# Patient Record
Sex: FEMALE | URBAN - METROPOLITAN AREA
[De-identification: names, ages, dates, MRNs, and addresses within clinical notes are randomized per-mention and may not be internally consistent; named-entity substitution may affect disease eponyms.]

---

## 2024-10-08 ENCOUNTER — APPOINTMENT (OUTPATIENT)
Dept: PRIMARY CARE | Facility: CLINIC | Age: 54
End: 2024-10-08
Payer: MEDICARE

## 2024-10-08 VITALS
DIASTOLIC BLOOD PRESSURE: 92 MMHG | WEIGHT: 238.2 LBS | HEIGHT: 65 IN | BODY MASS INDEX: 39.69 KG/M2 | SYSTOLIC BLOOD PRESSURE: 130 MMHG | OXYGEN SATURATION: 97 % | TEMPERATURE: 97.9 F | HEART RATE: 82 BPM

## 2024-10-08 DIAGNOSIS — M10.9 GOUT, UNSPECIFIED CAUSE, UNSPECIFIED CHRONICITY, UNSPECIFIED SITE: ICD-10-CM

## 2024-10-08 DIAGNOSIS — R53.81 DEBILITY: Primary | ICD-10-CM

## 2024-10-08 DIAGNOSIS — F41.1 GAD (GENERALIZED ANXIETY DISORDER): ICD-10-CM

## 2024-10-08 DIAGNOSIS — N39.0 FREQUENT UTI: ICD-10-CM

## 2024-10-08 DIAGNOSIS — M31.6 GCA (GIANT CELL ARTERITIS) (MULTI): ICD-10-CM

## 2024-10-08 DIAGNOSIS — F32.A DEPRESSION, UNSPECIFIED DEPRESSION TYPE: ICD-10-CM

## 2024-10-08 DIAGNOSIS — K90.89 OTHER INTESTINAL MALABSORPTION: ICD-10-CM

## 2024-10-08 DIAGNOSIS — K58.0 IRRITABLE BOWEL SYNDROME WITH DIARRHEA: ICD-10-CM

## 2024-10-08 DIAGNOSIS — E83.10 DISORDER OF IRON METABOLISM, UNSPECIFIED: ICD-10-CM

## 2024-10-08 DIAGNOSIS — Z72.0 TOBACCO ABUSE: ICD-10-CM

## 2024-10-08 DIAGNOSIS — K63.5 POLYP OF COLON, UNSPECIFIED PART OF COLON, UNSPECIFIED TYPE: ICD-10-CM

## 2024-10-08 DIAGNOSIS — M06.9 RHEUMATOID ARTHRITIS INVOLVING MULTIPLE SITES, UNSPECIFIED WHETHER RHEUMATOID FACTOR PRESENT (MULTI): ICD-10-CM

## 2024-10-08 PROBLEM — H54.7 VISUAL LOSS: Status: ACTIVE | Noted: 2024-10-08

## 2024-10-08 PROBLEM — N20.0 NEPHROLITHIASIS: Status: ACTIVE | Noted: 2024-10-08

## 2024-10-08 PROBLEM — Z98.84 H/O GASTRIC BYPASS: Status: ACTIVE | Noted: 2024-10-08

## 2024-10-08 PROBLEM — M15.0 PRIMARY OSTEOARTHRITIS INVOLVING MULTIPLE JOINTS: Status: ACTIVE | Noted: 2024-10-08

## 2024-10-08 PROBLEM — J30.2 SEASONAL ALLERGIES: Status: ACTIVE | Noted: 2024-10-08

## 2024-10-08 PROBLEM — E28.2 PCOS (POLYCYSTIC OVARIAN SYNDROME): Status: ACTIVE | Noted: 2024-10-08

## 2024-10-08 PROBLEM — Z90.49 HISTORY OF APPENDECTOMY: Status: ACTIVE | Noted: 2024-10-08

## 2024-10-08 LAB
POC APPEARANCE, URINE: CLEAR
POC BILIRUBIN, URINE: NEGATIVE
POC BLOOD, URINE: ABNORMAL
POC COLOR, URINE: YELLOW
POC GLUCOSE, URINE: NEGATIVE MG/DL
POC KETONES, URINE: NEGATIVE MG/DL
POC LEUKOCYTES, URINE: NEGATIVE
POC NITRITE,URINE: NEGATIVE
POC PH, URINE: 5.5 PH
POC PROTEIN, URINE: NEGATIVE MG/DL
POC SPECIFIC GRAVITY, URINE: 1.02
POC UROBILINOGEN, URINE: 1 EU/DL

## 2024-10-08 PROCEDURE — 99204 OFFICE O/P NEW MOD 45 MIN: CPT | Performed by: STUDENT IN AN ORGANIZED HEALTH CARE EDUCATION/TRAINING PROGRAM

## 2024-10-08 PROCEDURE — 3008F BODY MASS INDEX DOCD: CPT | Performed by: STUDENT IN AN ORGANIZED HEALTH CARE EDUCATION/TRAINING PROGRAM

## 2024-10-08 PROCEDURE — 81003 URINALYSIS AUTO W/O SCOPE: CPT | Performed by: STUDENT IN AN ORGANIZED HEALTH CARE EDUCATION/TRAINING PROGRAM

## 2024-10-08 RX ORDER — TOCILIZUMAB 180 MG/ML
162 INJECTION, SOLUTION SUBCUTANEOUS
COMMUNITY

## 2024-10-08 RX ORDER — DULOXETIN HYDROCHLORIDE 30 MG/1
CAPSULE, DELAYED RELEASE ORAL
COMMUNITY
Start: 2024-09-30

## 2024-10-08 RX ORDER — VALACYCLOVIR HYDROCHLORIDE 1 G/1
1 TABLET, FILM COATED ORAL
COMMUNITY
Start: 2024-08-28

## 2024-10-08 RX ORDER — DEXTROMETHORPHAN HYDROBROMIDE, GUAIFENESIN 5; 100 MG/5ML; MG/5ML
1300 LIQUID ORAL EVERY 12 HOURS PRN
COMMUNITY

## 2024-10-08 RX ORDER — CELECOXIB 50 MG/1
50 CAPSULE ORAL 2 TIMES DAILY
COMMUNITY

## 2024-10-08 ASSESSMENT — PATIENT HEALTH QUESTIONNAIRE - PHQ9
2. FEELING DOWN, DEPRESSED OR HOPELESS: NOT AT ALL
1. LITTLE INTEREST OR PLEASURE IN DOING THINGS: NOT AT ALL
SUM OF ALL RESPONSES TO PHQ9 QUESTIONS 1 AND 2: 0

## 2024-10-08 ASSESSMENT — ENCOUNTER SYMPTOMS
HEMATURIA: 0
LIGHT-HEADEDNESS: 0
DIZZINESS: 0
CHILLS: 0
SHORTNESS OF BREATH: 0
FLANK PAIN: 0
FEVER: 0
DYSURIA: 0
HEADACHES: 0

## 2024-10-08 NOTE — PROGRESS NOTES
Assessment/Plan   Problem List Items Addressed This Visit             ICD-10-CM    Debility - Primary R53.81    Relevant Orders    Disability Placard    Irritable bowel syndrome with diarrhea K58.0    Relevant Orders    Referral to Gastroenterology    KODY (generalized anxiety disorder) F41.1    Relevant Orders    CBC    Comprehensive Metabolic Panel    Lipid Panel    Hemoglobin A1C    Depression F32.A    Relevant Orders    CBC    Comprehensive Metabolic Panel    Lipid Panel    Hemoglobin A1C    Gout M10.9    Frequent UTI N39.0    Relevant Orders    CBC    Comprehensive Metabolic Panel    Lipid Panel    Hemoglobin A1C    POCT UA Automated manually resulted (Completed)    GCA (giant cell arteritis) (Multi) M31.6    Relevant Orders    CBC    Comprehensive Metabolic Panel    Lipid Panel    Hemoglobin A1C    Polyp of colon K63.5    Relevant Orders    Referral to Gastroenterology    Tobacco abuse Z72.0    Relevant Orders    Hemoglobin A1C     Other Visit Diagnoses         Codes    Rheumatoid arthritis involving multiple sites, unspecified whether rheumatoid factor present (Multi)     M06.9    Relevant Orders    CBC    Comprehensive Metabolic Panel    Lipid Panel    Hemoglobin A1C    Disorder of iron metabolism, unspecified     E83.10    Relevant Orders    Hemoglobin A1C    Other intestinal malabsorption     K90.89    Relevant Orders    Lipid Panel    Referral to Gastroenterology            Subjective   Patient ID: Yevgeniy Hdez is a 54 y.o. female with complex medical history including bilateral blindness (has 4% vision after shingles in 2019), debility, anxiety, depression, gout, frequent urinary tract infections, seasonal allergies, GCA, rheumatoid arthritis, colon polyps, PCOS, history of gastric bypass, anemia, osteoarthritis affecting multiple joints, nephrolithiasis, IBS-D who presents for New Patient Visit (Establish care with Dr Charles. Moved from [place] to [place] recently. She believe she may have a UTI- her sx  is low grade fever at night/ puss when she urinates and the smell of her urine is stronger. She has bleeding from RESHMA ears- when she wakes up there is blood on her pillows. She also is asking for Rx for Handicap. ).  HPI  Patient is accompanied by her daughter Simeon and daughter in law Brittani today.  Frequent UTIs    Patient states that she has been having low-grade fever and has noticed pus when she urinates and the smell of her urine is stronger  UA performed this office visit which showed small amount of blood but otherwise normal.     Bilateral ear bleeding, ear pain  Patient has noticed blood on her pillows when she wakes up and she believes this is coming from both of her ear for past week.  She has been using [employer] for ear discomfort (drying aid) She states she has tenderness bilateral ears and sensation of fullness. She has had these symptoms since June and they have been progressively worsening. Denies hearing loss.     Due to patient having bilateral vision loss she has had increased difficulty ambulating due to visual issues.    She does smoke, 1 pack every 2-3 days.   Review of Systems   Constitutional:  Negative for chills and fever.   HENT:  Positive for ear discharge and ear pain. Negative for congestion and hearing loss.    Respiratory:  Negative for shortness of breath.    Cardiovascular:  Negative for chest pain.   Genitourinary:  Negative for dysuria, flank pain and hematuria.        Stronger smell urine   Neurological:  Negative for dizziness, light-headedness and headaches.       Objective   Physical Exam  Constitutional:       Appearance: Normal appearance.   HENT:      Head: Normocephalic and atraumatic.      Right Ear: Tympanic membrane, ear canal and external ear normal.      Left Ear: Tympanic membrane, ear canal and external ear normal.   Cardiovascular:      Rate and Rhythm: Normal rate and regular rhythm.   Pulmonary:      Effort: Pulmonary effort is normal.      Breath sounds: Normal  breath sounds.   Neurological:      Mental Status: She is alert.              Vineet Charles MD 10/08/24 3:25 PM

## 2024-10-21 ENCOUNTER — LAB (OUTPATIENT)
Dept: LAB | Facility: LAB | Age: 54
End: 2024-10-21
Payer: MEDICARE

## 2024-10-21 DIAGNOSIS — Z72.0 TOBACCO ABUSE: ICD-10-CM

## 2024-10-21 DIAGNOSIS — F41.1 GAD (GENERALIZED ANXIETY DISORDER): ICD-10-CM

## 2024-10-21 DIAGNOSIS — M31.6 GCA (GIANT CELL ARTERITIS) (MULTI): ICD-10-CM

## 2024-10-21 DIAGNOSIS — F32.A DEPRESSION, UNSPECIFIED DEPRESSION TYPE: ICD-10-CM

## 2024-10-21 DIAGNOSIS — N39.0 FREQUENT UTI: ICD-10-CM

## 2024-10-21 DIAGNOSIS — M06.9 RHEUMATOID ARTHRITIS INVOLVING MULTIPLE SITES, UNSPECIFIED WHETHER RHEUMATOID FACTOR PRESENT (MULTI): ICD-10-CM

## 2024-10-21 DIAGNOSIS — E83.10 DISORDER OF IRON METABOLISM, UNSPECIFIED: ICD-10-CM

## 2024-10-21 DIAGNOSIS — K90.89 OTHER INTESTINAL MALABSORPTION: ICD-10-CM

## 2024-10-21 LAB
ALBUMIN SERPL BCP-MCNC: 3.6 G/DL (ref 3.4–5)
ALP SERPL-CCNC: 104 U/L (ref 33–110)
ALT SERPL W P-5'-P-CCNC: 14 U/L (ref 7–45)
ANION GAP SERPL CALC-SCNC: 9 MMOL/L (ref 10–20)
AST SERPL W P-5'-P-CCNC: 9 U/L (ref 9–39)
BILIRUB SERPL-MCNC: 0.6 MG/DL (ref 0–1.2)
BUN SERPL-MCNC: 8 MG/DL (ref 6–23)
CALCIUM SERPL-MCNC: 8.4 MG/DL (ref 8.6–10.3)
CHLORIDE SERPL-SCNC: 108 MMOL/L (ref 98–107)
CHOLEST SERPL-MCNC: 224 MG/DL (ref 0–199)
CHOLESTEROL/HDL RATIO: 4.6
CO2 SERPL-SCNC: 29 MMOL/L (ref 21–32)
CREAT SERPL-MCNC: 0.85 MG/DL (ref 0.5–1.05)
EGFRCR SERPLBLD CKD-EPI 2021: 81 ML/MIN/1.73M*2
ERYTHROCYTE [DISTWIDTH] IN BLOOD BY AUTOMATED COUNT: 15.3 % (ref 11.5–14.5)
EST. AVERAGE GLUCOSE BLD GHB EST-MCNC: 105 MG/DL
GLUCOSE SERPL-MCNC: 90 MG/DL (ref 74–99)
HBA1C MFR BLD: 5.3 %
HCT VFR BLD AUTO: 38.8 % (ref 36–46)
HDLC SERPL-MCNC: 48.9 MG/DL
HGB BLD-MCNC: 12.2 G/DL (ref 12–16)
LDLC SERPL CALC-MCNC: 148 MG/DL
MCH RBC QN AUTO: 29.1 PG (ref 26–34)
MCHC RBC AUTO-ENTMCNC: 31.4 G/DL (ref 32–36)
MCV RBC AUTO: 93 FL (ref 80–100)
NON HDL CHOLESTEROL: 175 MG/DL (ref 0–149)
NRBC BLD-RTO: 0 /100 WBCS (ref 0–0)
PLATELET # BLD AUTO: 187 X10*3/UL (ref 150–450)
POTASSIUM SERPL-SCNC: 4 MMOL/L (ref 3.5–5.3)
PROT SERPL-MCNC: 5.8 G/DL (ref 6.4–8.2)
RBC # BLD AUTO: 4.19 X10*6/UL (ref 4–5.2)
SODIUM SERPL-SCNC: 142 MMOL/L (ref 136–145)
TRIGL SERPL-MCNC: 136 MG/DL (ref 0–149)
VLDL: 27 MG/DL (ref 0–40)
WBC # BLD AUTO: 5.4 X10*3/UL (ref 4.4–11.3)

## 2024-10-21 PROCEDURE — 80061 LIPID PANEL: CPT

## 2024-10-21 PROCEDURE — 36415 COLL VENOUS BLD VENIPUNCTURE: CPT

## 2024-10-21 PROCEDURE — 83036 HEMOGLOBIN GLYCOSYLATED A1C: CPT

## 2024-10-21 PROCEDURE — 80053 COMPREHEN METABOLIC PANEL: CPT

## 2024-10-21 PROCEDURE — 85027 COMPLETE CBC AUTOMATED: CPT

## 2024-10-22 NOTE — RESULT ENCOUNTER NOTE
Labs show high cholesterol.  Calcium very slightly low. Otherwise normal labs. Would ensure eating low cholesterol diet.

## 2024-10-25 ENCOUNTER — TELEPHONE (OUTPATIENT)
Dept: PRIMARY CARE | Facility: CLINIC | Age: 54
End: 2024-10-25
Payer: MEDICARE

## 2024-10-25 NOTE — TELEPHONE ENCOUNTER
----- Message from Vineet Charles sent at 10/22/2024  8:14 AM EDT -----  Labs show high cholesterol.  Calcium very slightly low. Otherwise normal labs. Would ensure eating low cholesterol diet.

## 2025-01-14 ENCOUNTER — APPOINTMENT (OUTPATIENT)
Dept: PRIMARY CARE | Facility: CLINIC | Age: 55
End: 2025-01-14
Payer: MEDICARE

## 2025-01-14 VITALS
HEIGHT: 65 IN | SYSTOLIC BLOOD PRESSURE: 140 MMHG | WEIGHT: 236.6 LBS | OXYGEN SATURATION: 96 % | DIASTOLIC BLOOD PRESSURE: 80 MMHG | HEART RATE: 74 BPM | BODY MASS INDEX: 39.42 KG/M2 | TEMPERATURE: 97.4 F

## 2025-01-14 DIAGNOSIS — F51.04 CHRONIC INSOMNIA: ICD-10-CM

## 2025-01-14 DIAGNOSIS — Z76.0 MEDICATION REFILL: Primary | ICD-10-CM

## 2025-01-14 DIAGNOSIS — E78.49 OTHER HYPERLIPIDEMIA: Primary | ICD-10-CM

## 2025-01-14 DIAGNOSIS — Z12.4 CERVICAL CANCER SCREENING: ICD-10-CM

## 2025-01-14 DIAGNOSIS — Z12.31 ENCOUNTER FOR SCREENING MAMMOGRAM FOR MALIGNANT NEOPLASM OF BREAST: ICD-10-CM

## 2025-01-14 PROCEDURE — 99213 OFFICE O/P EST LOW 20 MIN: CPT | Performed by: STUDENT IN AN ORGANIZED HEALTH CARE EDUCATION/TRAINING PROGRAM

## 2025-01-14 PROCEDURE — 3008F BODY MASS INDEX DOCD: CPT | Performed by: STUDENT IN AN ORGANIZED HEALTH CARE EDUCATION/TRAINING PROGRAM

## 2025-01-14 RX ORDER — CYANOCOBALAMIN 1000 UG/ML
1000 INJECTION, SOLUTION INTRAMUSCULAR; SUBCUTANEOUS
Qty: 1 ML | Refills: 3 | Status: SHIPPED | OUTPATIENT
Start: 2025-01-14

## 2025-01-14 RX ORDER — BUPROPION HYDROCHLORIDE 300 MG/1
300 TABLET ORAL
COMMUNITY
Start: 2023-05-11 | End: 2025-01-14 | Stop reason: SDUPTHER

## 2025-01-14 RX ORDER — BUPROPION HYDROCHLORIDE 300 MG/1
300 TABLET ORAL
Qty: 90 TABLET | Refills: 3 | Status: SHIPPED | OUTPATIENT
Start: 2025-01-14

## 2025-01-14 RX ORDER — CELECOXIB 50 MG/1
50 CAPSULE ORAL 2 TIMES DAILY
Qty: 90 CAPSULE | Refills: 3 | Status: SHIPPED | OUTPATIENT
Start: 2025-01-14

## 2025-01-14 RX ORDER — MINERAL OIL
180 ENEMA (ML) RECTAL
COMMUNITY

## 2025-01-14 RX ORDER — PRAVASTATIN SODIUM 10 MG/1
10 TABLET ORAL DAILY
Qty: 100 TABLET | Refills: 3 | Status: SHIPPED | OUTPATIENT
Start: 2025-01-14 | End: 2026-02-18

## 2025-01-14 RX ORDER — CYANOCOBALAMIN 1000 UG/ML
1000 INJECTION, SOLUTION INTRAMUSCULAR; SUBCUTANEOUS
COMMUNITY
Start: 2023-05-11 | End: 2025-01-14 | Stop reason: SDUPTHER

## 2025-01-14 ASSESSMENT — ENCOUNTER SYMPTOMS
NAUSEA: 0
HEMATURIA: 0
FEVER: 0
DIZZINESS: 0
DIARRHEA: 1
DYSPHORIC MOOD: 0
UNEXPECTED WEIGHT CHANGE: 0
CONSTIPATION: 1
COUGH: 0
LIGHT-HEADEDNESS: 0
HEADACHES: 1
FATIGUE: 1
ABDOMINAL PAIN: 0
DYSURIA: 0
ARTHRALGIAS: 1
CHILLS: 0
RHINORRHEA: 0
BLOOD IN STOOL: 0
NERVOUS/ANXIOUS: 1
SLEEP DISTURBANCE: 1
PALPITATIONS: 0
VOMITING: 0
SHORTNESS OF BREATH: 0

## 2025-01-14 ASSESSMENT — PATIENT HEALTH QUESTIONNAIRE - PHQ9
1. LITTLE INTEREST OR PLEASURE IN DOING THINGS: SEVERAL DAYS
SUM OF ALL RESPONSES TO PHQ9 QUESTIONS 1 & 2: 1
2. FEELING DOWN, DEPRESSED OR HOPELESS: NOT AT ALL

## 2025-01-14 NOTE — PROGRESS NOTES
"  Assessment/Plan   Problem List Items Addressed This Visit    None  Visit Diagnoses         Codes    Other hyperlipidemia    -  Primary E78.49    Relevant Medications    pravastatin (Pravachol) 10 mg tablet    Other Relevant Orders    Hepatic function panel    Chronic insomnia     F51.04    Relevant Orders    Referral to Adult Sleep Medicine    Encounter for screening mammogram for malignant neoplasm of breast     Z12.31    patient refuses     Cervical cancer screening     Z12.4    Relevant Orders    Referral to Obstetrics / Gynecology            Subjective   Patient ID: Yevgeniy Hdez \"Cee" is a 54 y.o. female who presents for Annual Exam.  HPI  Patient presents for physical exam. Patient does not go to dentist regularly (does not have teeth) and has vision exams regularly. Discussed healthy eating guidelines with patient, patient does try to eat a balanced diet with fruits, vegetables, protein and complex carbohydrates (struggling currently due to awaiting for dental implants/bridge placement). Encouraged patient to exercise regularly 30-45 minutes at least three times a week.     Reviewed healthcare maintenance with patient.     She has history of chronic insomnia. She has tried trazodone, melatonin in past. Discussed referral to sleep medicine.     Review of Systems   Constitutional:  Positive for fatigue. Negative for chills, fever and unexpected weight change.   HENT:  Positive for congestion. Negative for rhinorrhea.    Respiratory:  Negative for cough and shortness of breath.    Cardiovascular:  Negative for chest pain, palpitations and leg swelling.   Gastrointestinal:  Positive for constipation and diarrhea. Negative for abdominal pain, blood in stool, nausea and vomiting.   Genitourinary:  Negative for dysuria and hematuria.   Musculoskeletal:  Positive for arthralgias. Negative for gait problem.   Skin:  Negative for rash.   Neurological:  Positive for headaches (chronic). Negative for dizziness and " light-headedness.   Psychiatric/Behavioral:  Positive for sleep disturbance. Negative for dysphoric mood. The patient is nervous/anxious.        Objective   Physical Exam  Constitutional:       General: She is not in acute distress.     Appearance: Normal appearance. She is obese.   HENT:      Head: Normocephalic and atraumatic.      Right Ear: Tympanic membrane, ear canal and external ear normal. There is no impacted cerumen.      Left Ear: Tympanic membrane, ear canal and external ear normal. There is no impacted cerumen.      Mouth/Throat:      Mouth: Mucous membranes are moist.      Pharynx: Oropharynx is clear. No oropharyngeal exudate or posterior oropharyngeal erythema.   Eyes:      General:         Right eye: No discharge.         Left eye: No discharge.      Extraocular Movements: Extraocular movements intact.      Conjunctiva/sclera: Conjunctivae normal.      Pupils: Pupils are equal, round, and reactive to light.   Cardiovascular:      Rate and Rhythm: Normal rate and regular rhythm.      Pulses: Normal pulses.      Heart sounds: Normal heart sounds. No murmur heard.     No friction rub. No gallop.   Pulmonary:      Effort: Pulmonary effort is normal. No respiratory distress.      Breath sounds: Normal breath sounds. No stridor. No wheezing, rhonchi or rales.   Musculoskeletal:      Right lower leg: No edema.      Left lower leg: No edema.   Skin:     General: Skin is warm and dry.      Capillary Refill: Capillary refill takes less than 2 seconds.   Neurological:      General: No focal deficit present.      Mental Status: She is alert.   Psychiatric:         Mood and Affect: Mood normal.         Behavior: Behavior normal.         Thought Content: Thought content normal.         Judgment: Judgment normal.               Vineet Charles MD 01/14/25 10:01 AM

## 2025-01-14 NOTE — TELEPHONE ENCOUNTER
Patient called asking for Celebrex, B-12 and Wellbutrin to be sent to the [facility] in [place]. She is totally out and wants to get back on these.

## 2025-02-11 ENCOUNTER — OFFICE VISIT (OUTPATIENT)
Dept: OBSTETRICS AND GYNECOLOGY | Facility: CLINIC | Age: 55
End: 2025-02-11
Payer: MEDICARE

## 2025-02-11 VITALS
BODY MASS INDEX: 36.57 KG/M2 | WEIGHT: 233 LBS | HEIGHT: 67 IN | SYSTOLIC BLOOD PRESSURE: 141 MMHG | DIASTOLIC BLOOD PRESSURE: 67 MMHG

## 2025-02-11 DIAGNOSIS — Z12.4 CERVICAL CANCER SCREENING: ICD-10-CM

## 2025-02-11 DIAGNOSIS — Z12.31 ENCOUNTER FOR SCREENING MAMMOGRAM FOR MALIGNANT NEOPLASM OF BREAST: Primary | ICD-10-CM

## 2025-02-11 DIAGNOSIS — Z01.419 WOMEN'S ANNUAL ROUTINE GYNECOLOGICAL EXAMINATION: ICD-10-CM

## 2025-02-11 PROCEDURE — 3008F BODY MASS INDEX DOCD: CPT | Performed by: OBSTETRICS & GYNECOLOGY

## 2025-02-11 PROCEDURE — 4004F PT TOBACCO SCREEN RCVD TLK: CPT | Performed by: OBSTETRICS & GYNECOLOGY

## 2025-02-11 PROCEDURE — 99386 PREV VISIT NEW AGE 40-64: CPT | Performed by: OBSTETRICS & GYNECOLOGY

## 2025-02-11 RX ORDER — TRAZODONE HYDROCHLORIDE 50 MG/1
25 TABLET ORAL
COMMUNITY
Start: 2023-05-11

## 2025-02-11 RX ORDER — PANTOPRAZOLE SODIUM 40 MG/1
40 TABLET, DELAYED RELEASE ORAL
COMMUNITY
Start: 2023-05-11

## 2025-02-11 RX ORDER — FUROSEMIDE 20 MG/1
20 TABLET ORAL
COMMUNITY
Start: 2023-05-11

## 2025-02-11 RX ORDER — PROMETHAZINE HYDROCHLORIDE 12.5 MG/1
12.5 TABLET ORAL EVERY 12 HOURS PRN
COMMUNITY
Start: 2023-05-11

## 2025-02-11 SDOH — HEALTH STABILITY: PHYSICAL HEALTH: ON AVERAGE, HOW MANY MINUTES DO YOU ENGAGE IN EXERCISE AT THIS LEVEL?: 30 MIN

## 2025-02-11 SDOH — ECONOMIC STABILITY: TRANSPORTATION INSECURITY
IN THE PAST 12 MONTHS, HAS LACK OF TRANSPORTATION KEPT YOU FROM MEETINGS, WORK, OR FROM GETTING THINGS NEEDED FOR DAILY LIVING?: NO

## 2025-02-11 SDOH — ECONOMIC STABILITY: TRANSPORTATION INSECURITY
IN THE PAST 12 MONTHS, HAS THE LACK OF TRANSPORTATION KEPT YOU FROM MEDICAL APPOINTMENTS OR FROM GETTING MEDICATIONS?: NO

## 2025-02-11 SDOH — HEALTH STABILITY: PHYSICAL HEALTH: ON AVERAGE, HOW MANY DAYS PER WEEK DO YOU ENGAGE IN MODERATE TO STRENUOUS EXERCISE (LIKE A BRISK WALK)?: 3 DAYS

## 2025-02-11 ASSESSMENT — ENCOUNTER SYMPTOMS
RESPIRATORY NEGATIVE: 0
ENDOCRINE NEGATIVE: 0
PSYCHIATRIC NEGATIVE: 0
LOSS OF SENSATION IN FEET: 0
OCCASIONAL FEELINGS OF UNSTEADINESS: 0
CONSTITUTIONAL NEGATIVE: 0
GASTROINTESTINAL NEGATIVE: 0
NEUROLOGICAL NEGATIVE: 0
DEPRESSION: 0
CARDIOVASCULAR NEGATIVE: 0
MUSCULOSKELETAL NEGATIVE: 0
HEMATOLOGIC/LYMPHATIC NEGATIVE: 0
EYES NEGATIVE: 0
ALLERGIC/IMMUNOLOGIC NEGATIVE: 0

## 2025-02-11 ASSESSMENT — LIFESTYLE VARIABLES
SKIP TO QUESTIONS 9-10: 1
HOW OFTEN DO YOU HAVE A DRINK CONTAINING ALCOHOL: NEVER
AUDIT-C TOTAL SCORE: 0
HOW OFTEN DO YOU HAVE SIX OR MORE DRINKS ON ONE OCCASION: NEVER
HOW MANY STANDARD DRINKS CONTAINING ALCOHOL DO YOU HAVE ON A TYPICAL DAY: PATIENT DOES NOT DRINK

## 2025-02-11 ASSESSMENT — SOCIAL DETERMINANTS OF HEALTH (SDOH)
WITHIN THE LAST YEAR, HAVE YOU BEEN AFRAID OF YOUR PARTNER OR EX-PARTNER?: NO
IN THE PAST 12 MONTHS, HAS THE ELECTRIC, GAS, OIL, OR WATER COMPANY THREATENED TO SHUT OFF SERVICE IN YOUR HOME?: NO
WITHIN THE LAST YEAR, HAVE YOU BEEN KICKED, HIT, SLAPPED, OR OTHERWISE PHYSICALLY HURT BY YOUR PARTNER OR EX-PARTNER?: NO
WITHIN THE LAST YEAR, HAVE TO BEEN RAPED OR FORCED TO HAVE ANY KIND OF SEXUAL ACTIVITY BY YOUR PARTNER OR EX-PARTNER?: NO
HOW HARD IS IT FOR YOU TO PAY FOR THE VERY BASICS LIKE FOOD, HOUSING, MEDICAL CARE, AND HEATING?: NOT VERY HARD
WITHIN THE LAST YEAR, HAVE YOU BEEN HUMILIATED OR EMOTIONALLY ABUSED IN OTHER WAYS BY YOUR PARTNER OR EX-PARTNER?: NO

## 2025-02-11 ASSESSMENT — PAIN SCALES - GENERAL: PAINLEVEL_OUTOF10: 0-NO PAIN

## 2025-02-11 NOTE — PROGRESS NOTES
"Yevgeniy Hdez \"Margie\" is a 54 y.o. female who is here for a routine exam. PCP = Vineet Charles MD  Patient here for annual exam no complaints.  Is legally blind.  Patient has not been sexually active for 20 years.    Review of Systems  Patient is legally blind.  Secondary to optic shingle infection and    Physical Exam  Constitutional:       Appearance: Normal appearance. She is obese.   Genitourinary:      Genitourinary Comments: External genitalia unremarkable  Vagina clear  Cervix closed uterus small anteverted  Adnexa masses or tenderness  Perineum without lesions or swelling    Breast masses tenderness or nipple discharge, normal appearance   Breasts:     Breasts are soft.     Right: Normal.      Left: Normal.   HENT:      Head: Normocephalic.      Nose: Nose normal.   Eyes:      Pupils: Pupils are equal, round, and reactive to light.   Cardiovascular:      Rate and Rhythm: Normal rate and regular rhythm.   Pulmonary:      Effort: Pulmonary effort is normal.      Breath sounds: Normal breath sounds.   Abdominal:      General: Abdomen is flat. Bowel sounds are normal.      Palpations: Abdomen is soft.   Musculoskeletal:         General: Normal range of motion.      Cervical back: Normal range of motion and neck supple.   Neurological:      General: No focal deficit present.      Mental Status: She is alert.   Skin:     General: Skin is warm and dry.   Psychiatric:         Mood and Affect: Mood normal.         Behavior: Behavior normal.         Thought Content: Thought content normal.         Judgment: Judgment normal.   Vitals reviewed.     Objective   /67   Ht 1.702 m (5' 7\")   Wt 106 kg (233 lb)   LMP  (LMP Unknown)   BMI 36.49 kg/m²   OB History          3    Para   2    Term   2            AB   1    Living   2         SAB   1    IAB        Ectopic        Multiple        Live Births   2                  GynHx:  Menarche/Menopause:     Social History     Substance and Sexual " Activity   Sexual Activity Not on file     STIs: none    Substance:   Tobacco Use: High Risk (1/14/2025)    Patient History     Smoking Tobacco Use: Every Day     Smokeless Tobacco Use: Never     Passive Exposure: Not on file      Social History     Substance and Sexual Activity   Drug Use Not Currently    Types: Marijuana    Comment: stopped doing Marijuana      Social History     Substance and Sexual Activity   Alcohol Use Not Currently     Abuse: No  Depression Screen:   Screening    Past med hx and past surg hx reviewed and notable for: Feels infection with involvement of the optic nerve and subsequent vision loss    Assessment/Plan    Unremarkable annual GYN exam.  Patient has not been sexually active in approximately 20 years.  STD testing not done.  Discussed diet and exercise.  Mammogram ordered the patient notes she will not have it done when she feels a lump.  Has been doing self breast exams on a regular basis.  Pap smear obtained.  Return to office in 1 year or as needed

## 2025-02-20 NOTE — PROGRESS NOTES
"       [facility]  [facility] [address]  [facility] [address]  [id number] [place] [address]     [facility]  New Visit Note      Subjective   Patient ID: Yevgeniy Hdez \"Cee" is a 54 y.o. female with past medical history significant for Obesity, Anxiety, Depression, Nicotine dependence, Sleep disturbance, and Sleep disorder breathing.    2/28/2025: The patient is here accompanied by a brother, who added to the history and was referred by PCP Vineet Charles MD, for comprehensive sleep medicine evaluation due to excessive daytime sleepiness/fatigue, difficulty falling/staying asleep, sleep apnea on pap, and needing a new machine. She lost 270 lbs after Bariatric Surgery and no longer uses a pap. She got Shingles in 2019, lost her version, and regained some weight. Now, she is unable to fill out the form by herself. Her ESS is 1,  SALVATORE is 18, and neck circumference is 14.5  inches today.     HPI  Patient had been having these symptoms for the past 40-50 years. Patient had sleep study done in the past but no available records.       SLEEP STUDY HISTORY: (personally reviewed raw data such as interpretation report, data sheet, hypnogram, and titration table if available and applicable)  N/A    SLEEP-WAKE SCHEDULE  Bedtime: 8 PM  on weekdays, same on weekends  Subjective sleep latency: 2-3 hours  Problems falling asleep: Yes  Number of awakenings: 2-3 times per night spontaneously for unknown reasons (1 for BR)  Falls back asleep in 1 hour  Problems staying asleep: Yes  Final wake time: 7 AM  on weekdays, same on weekends  Shift work: No  Naps: No  Average sleep duration (excluding naps): 2-4 hours    SLEEP ENVIRONMENT  Sleep location: bed  Sleep status: sleeps alone  Room is dark:  Yes  Room is quiet: Yes  Room is cool: Yes  Bed comfort: good    SLEEP HABITS:   Activities before bedtime: smoking, listen TV  Activities in bed: TV on  Preferred " sleep position: right side    SLEEP ROS:  Night symptoms: POSITIVE for nasal congestion  and mouth breathing  Morning symptoms: POSITIVE for unrefreshing sleep, morning headache, and morning dry mouth  Daytime symptoms POSITIVE for excessive daytime sleepiness, fatigue, trouble remembering things in daytime, and irritability in daytime  Hypersomnia / narcolepsy symptoms: Patient denies symptoms of a hypersomnolence disorder such as sleep paralysis, sleep-related hallucinations, and cataplexy.   RLS symptoms: Patient denies RLS symptoms.  Movements in sleep: Patient denies problematic movements in sleep such as seizures during sleep, frequent leg kicks / jerks while asleep, sleep-related bruxism, and waking up with bedsheets in disarray.  Parasomnia symptoms: Patient denies symptoms of parasomnia such as sleepwalking, sleeptalking, sleep-eating, acting out dreams, and nightmares.     WEIGHT: gained 15 lbs in 6 months     REVIEW OF SYSTEMS: All other systems have been reviewed and are negative.    PERTINENT SOCIAL HISTORY:  Occupation: disabled  Smoking: Yes   ETOH: No   Marijuana: No   Caffeine: Yes, Soda  Sleep aids: Yes  Claustrophobia: No     PERTINENT PAST SURGICAL HISTORY:  non-contributory    PERTINENT FAMILY HISTORY:  sleep apnea- mother, father, brothers    Active Problems, Allergy List, Medication List, and PMH/PSH/FH/Social Hx have been reviewed and reconciled in chart. No significant changes unless documented in the pertinent chart section. Updates made when necessary.       Objective   Vitals:    02/28/25 0906   BP: 137/87   Pulse: 74   Resp: 18   SpO2: 96%      REVIEW OF SYSTEMS  All other systems have been reviewed and are negative.    ALLERGIES  Allergies   Allergen Reactions    Pineapple Anaphylaxis    Orange Hives    Sweet Orange Hives       MEDICATIONS  Current Outpatient Medications   Medication Sig Dispense Refill    acetaminophen (Tylenol Arthritis Pain) 650 mg ER tablet Take 2 tablets (1,300 mg)  "by mouth every 12 hours if needed for mild pain (1 - 3). Do not crush, chew, or split.      buPROPion XL (Wellbutrin XL) 300 mg 24 hr tablet Take 1 tablet (300 mg) by mouth once daily. 90 tablet 3    celecoxib (CeleBREX) 50 mg capsule Take 1 capsule (50 mg) by mouth 2 times a day. 90 capsule 3    cyanocobalamin (Vitamin B-12) 1,000 mcg/mL injection Inject 1 mL (1,000 mcg) into the muscle every 30 (thirty) days. 1 mL 3    DULoxetine (Cymbalta) 30 mg DR capsule TAKE 1 CAPSULE BY MOUTH IN THE MORNING AND 2 AT BEDTIME. DO NOT CHEW, CRUSH, OR OPEN CAPSULES      fexofenadine (Allegra) 180 mg tablet Take 1 tablet (180 mg) by mouth once daily.      furosemide (Lasix) 20 mg tablet Take 1 tablet (20 mg) by mouth every other day.      pantoprazole (ProtoNix) 40 mg EC tablet Take 1 tablet (40 mg) by mouth once daily.      pravastatin (Pravachol) 10 mg tablet Take 1 tablet (10 mg) by mouth once daily. 100 tablet 3    promethazine (Phenergan) 12.5 mg tablet Take 1 tablet (12.5 mg) by mouth every 12 hours if needed.      traZODone (Desyrel) 50 mg tablet Take 0.5 tablets (25 mg) by mouth once daily.      valACYclovir (Valtrex) 1 gram tablet Take 1 tablet (1,000 mg) by mouth every 12 hours.       No current facility-administered medications for this visit.         Physical Exam  Constitutional:alert and oriented to time, place, and person  Sinus: - tenderness to palpation  Palate: Narrow Mallampati 3, - Tongue scalloping, - macroglossia  Lungs: Clear to auscultation bilateral, no rales  Heart: Regular rate and rhythm, no murmurs    Assessment/Plan   Yevgeniy Hdez \"Margie\" is a 54 y.o. female who is seen to evaluate for obstructive sleep apnea. The pathophysiology of sleep apnea, diagnostic testing (HST vs PSG), treatment options (PAP, oral appliance, surgery, hypoglossal nerve stimulator called Inspire), and supportive management (weight loss, positional therapy, smoking cessation, avoidance of alcohol and sedatives) were " "discussed with the patient in detail. Risk factors of sleep apnea as well as cardiometabolic and neurocognitive sequelae associated with untreated sleep apnea were also discussed. Lastly, patient was advised to avoid driving vehicle or operating heavy machinery when sleepy.     Yevgeniy Hdez \"Margie\" with the following problems:     # Sleep disorder breathing :   -Instruct patient to complete a home sleep study.  -HSAT is reasonable as patient likely has SALTY based on history and exam and does not have any of the following comorbidities: CHF, neuromuscular weakness, hypoventilation, or significant COPD.  -Sleep apnea and PAP therapy education were provided at length in the clinic today. Yevgeniy Hdez \"Margie\" verbalized understanding.  -Emphasized diet, exercise, and weight loss in the clinic, as were non-supine sleep, avoiding alcohol in the late evening, and driving or operating heavy machinery when sleepy.  -Yevgeniy Hdez \"Margie\"verbalizes understanding of the above instructions and risks.    # CHRONIC SLEEP ONSET/ SLEEP MAINTENANCE INSOMNIA:  -likely due to poor sleep hygiene, and untreated sleep apnea.  -Sleep hygiene discuss in the clinic.    # DEPRESSION and ANXIETY:   -Yevgeniy MAS Lemuel Horvath"Margie\"is taking buPROPion XL (Wellbutrin XL) 300 mg in the morning, and celecoxib (CeleBREX) 50 mg BID.  -Denies HI/SI     # OBESITY :  -with a BMI of 39.11. Yevgeniy Hdez \"Margie\" most recent Bicarbonate was 29  Bicarbonate   Date Value Ref Range Status   10/21/2024 29 21 - 32 mmol/L Final   -Encourage to have regular exercise to manage weight well.    # XEROSTOMIA:  -Instruct Yevgeniy Hdez \"Margie\"to purchase the Biotene gel to ease the dry mouth symptom,     # TOBACCO ABUSE:Yevgeniy MAS Lemuel Horvath"Margie\" is a current 1 PPD smoker for 30 years.  -Smoking Cessation given    # CIRCADIAN RHYTHM SLEEP-WAKE DISORDER:  Non-24 Sleep-Wake Rhythm:  -Set bedtime and wake time.   -Daytime routine including scheduled physical " activity, social activity and meal times.    RTC 3 months      All of patient's questions were answered. She verbalizes understanding and agreement with my assessment and plan.    Please excuse any errors in grammar or translation related to dictation. Thanks.

## 2025-02-21 LAB
CYTOLOGY CMNT CVX/VAG CYTO-IMP: NORMAL
HPV HR 12 DNA GENITAL QL NAA+PROBE: NEGATIVE
HPV HR GENOTYPES PNL CVX NAA+PROBE: NEGATIVE
HPV16 DNA SPEC QL NAA+PROBE: NEGATIVE
HPV18 DNA SPEC QL NAA+PROBE: NEGATIVE
LAB AP HPV GENOTYPE QUESTION: YES
LAB AP HPV HR: NORMAL
LABORATORY COMMENT REPORT: NORMAL
PATH REPORT.TOTAL CANCER: NORMAL

## 2025-02-25 ENCOUNTER — APPOINTMENT (OUTPATIENT)
Facility: CLINIC | Age: 55
End: 2025-02-25
Payer: MEDICARE

## 2025-02-25 VITALS — BODY MASS INDEX: 39.65 KG/M2 | HEIGHT: 65 IN | HEART RATE: 89 BPM | WEIGHT: 238 LBS

## 2025-02-25 DIAGNOSIS — K63.5 POLYP OF COLON, UNSPECIFIED PART OF COLON, UNSPECIFIED TYPE: ICD-10-CM

## 2025-02-25 DIAGNOSIS — K58.0 IRRITABLE BOWEL SYNDROME WITH DIARRHEA: ICD-10-CM

## 2025-02-25 DIAGNOSIS — Z12.11 COLON CANCER SCREENING: ICD-10-CM

## 2025-02-25 DIAGNOSIS — Z86.0101 PERSONAL HISTORY OF ADENOMATOUS AND SERRATED COLON POLYPS: Primary | ICD-10-CM

## 2025-02-25 DIAGNOSIS — K90.89 OTHER INTESTINAL MALABSORPTION: ICD-10-CM

## 2025-02-25 PROCEDURE — 3008F BODY MASS INDEX DOCD: CPT | Performed by: INTERNAL MEDICINE

## 2025-02-25 PROCEDURE — 99204 OFFICE O/P NEW MOD 45 MIN: CPT | Performed by: INTERNAL MEDICINE

## 2025-02-25 PROCEDURE — 4004F PT TOBACCO SCREEN RCVD TLK: CPT | Performed by: INTERNAL MEDICINE

## 2025-02-25 RX ORDER — ONDANSETRON HYDROCHLORIDE 8 MG/1
8 TABLET, FILM COATED ORAL EVERY 8 HOURS PRN
COMMUNITY

## 2025-02-25 RX ORDER — SODIUM, POTASSIUM,MAG SULFATES 17.5-3.13G
SOLUTION, RECONSTITUTED, ORAL ORAL
Qty: 1 EACH | Refills: 0 | Status: SHIPPED | OUTPATIENT
Start: 2025-02-25

## 2025-02-25 NOTE — PROGRESS NOTES
Primary Care Provider: Vineet Charles MD  Referring Provider: Vineet Charles MD  My final recommendations will be communicated back to the referring physician and/or primary care provider via shared medical records or via US mail.    Chief Complaint (Reason for visit):   Yevgeniy Hdez is a 54 y.o. female who presents for follow up of colon polyps    ASSESSMENT AND PLAN     Assessment & Plan  Personal history of adenomatous and serrated colon polyps  Pt is due for her next Cscope  Strong f/h of colon polyps in father    Orders:    Colonoscopy Diagnostic; Future    sodium,potassium,mag sulfates (Suprep) 17.5-3.13-1.6 gram solution; Take one bottle beginning at 6pm night before procedure and then take the other bottle 5 hours before procedure time as directed per instruction sheet    Colon cancer screening    Orders:    Colonoscopy Diagnostic; Future    sodium,potassium,mag sulfates (Suprep) 17.5-3.13-1.6 gram solution; Take one bottle beginning at 6pm night before procedure and then take the other bottle 5 hours before procedure time as directed per instruction sheet      I discussed the risks, benefits and alternative(s) of the procedure(s) with the patient. Pt verbalizes understanding and is willing to proceed. All questions were answered.    Thierry Laureano MD, MS    SUBJECTIVE   HPI: History obtained from patient. Pt comes with her daughter    Pt is moving here from [place]. She has history of polyps. She was told she needed a repeat colonoscopy every 5 years. She is due for her next colonoscopy.    From a GI standpoint she denies any significant symptoms  Pt denies lower abdominal pain, constipation, diarrhea, blood in stool    Of note, she is s/p [place]    Patient's father had 32 polyps  Past Medical History:   Diagnosis Date    Allergies     Headache     History of appendectomy     History of cholecystectomy     History of D&C     pcos    IBS (irritable bowel syndrome)     PCOS (polycystic ovarian  "syndrome)        Past Surgical History:   Procedure Laterality Date    CATARACT EXTRACTION, BILATERAL      EYE SURGERY      3x- all under age 7       Current Outpatient Medications   Medication Sig Dispense Refill    acetaminophen (Tylenol Arthritis Pain) 650 mg ER tablet Take 2 tablets (1,300 mg) by mouth every 12 hours if needed for mild pain (1 - 3). Do not crush, chew, or split.      buPROPion XL (Wellbutrin XL) 300 mg 24 hr tablet Take 1 tablet (300 mg) by mouth once daily. 90 tablet 3    celecoxib (CeleBREX) 50 mg capsule Take 1 capsule (50 mg) by mouth 2 times a day. 90 capsule 3    cyanocobalamin (Vitamin B-12) 1,000 mcg/mL injection Inject 1 mL (1,000 mcg) into the muscle every 30 (thirty) days. 1 mL 3    DULoxetine (Cymbalta) 30 mg DR capsule TAKE 1 CAPSULE BY MOUTH IN THE MORNING AND 2 AT BEDTIME. DO NOT CHEW, CRUSH, OR OPEN CAPSULES      fexofenadine (Allegra) 180 mg tablet Take 1 tablet (180 mg) by mouth once daily.      ondansetron (Zofran) 8 mg tablet Take 1 tablet (8 mg) by mouth every 8 hours if needed for nausea or vomiting.      pantoprazole (ProtoNix) 40 mg EC tablet Take 1 tablet (40 mg) by mouth once daily.      pravastatin (Pravachol) 10 mg tablet Take 1 tablet (10 mg) by mouth once daily. 100 tablet 3    promethazine (Phenergan) 12.5 mg tablet Take 1 tablet (12.5 mg) by mouth every 12 hours if needed.      traZODone (Desyrel) 50 mg tablet Take 0.5 tablets (25 mg) by mouth once daily.      valACYclovir (Valtrex) 1 gram tablet Take 1 tablet (1,000 mg) by mouth every 12 hours.       No current facility-administered medications for this visit.       Allergies   Allergen Reactions    Pineapple Anaphylaxis    Orange Hives    Sweet [place] Hives     OBJECTIVE   PHYSICAL EXAM:  Pulse 89   Ht 1.651 m (5' 5\")   Wt 108 kg (238 lb)   LMP  (LMP Unknown)   BMI 39.61 kg/m²      LABS/IMAGING/SCOPES  Lab Results   Component Value Date    WBC 5.4 10/21/2024    HGB 12.2 10/21/2024    HCT 38.8 10/21/2024    " MCV 93 10/21/2024     10/21/2024     Lab Results   Component Value Date    GLUCOSE 90 10/21/2024    CALCIUM 8.4 (L) 10/21/2024     10/21/2024    K 4.0 10/21/2024    CO2 29 10/21/2024     (H) 10/21/2024    BUN 8 10/21/2024    CREATININE 0.85 10/21/2024     Lab Results   Component Value Date    ALT 14 10/21/2024    AST 9 10/21/2024    ALKPHOS 104 10/21/2024    BILITOT 0.6 10/21/2024           Thierry Laureano MD, MS

## 2025-02-25 NOTE — LETTER
February 25, 2025     Vineet Charles MD  [address]  [facility], [address]  [facility] [address]    Patient: Margie Hdez   YOB: 1970   Date of Visit: 2/25/2025       Dear Dr. Vineet Charles MD:    Thank you for referring Margie Hdez to me for evaluation. Below are my notes for this consultation.  If you have questions, please do not hesitate to call me. I look forward to following your patient along with you.       Sincerely,     Thierry Laureano MD, MS      CC: No Recipients  ______________________________________________________________________________________    Primary Care Provider: Vineet Charles MD  Referring Provider: Vineet Charles MD  My final recommendations will be communicated back to the referring physician and/or primary care provider via shared medical records or via US mail.    Chief Complaint (Reason for visit):   Yevgeniy Hdez is a 54 y.o. female who presents for follow up of colon polyps    ASSESSMENT AND PLAN     Assessment & Plan  Personal history of adenomatous and serrated colon polyps  Pt is due for her next Cscope  Strong f/h of colon polyps in father    Orders:  •  Colonoscopy Diagnostic; Future  •  sodium,potassium,mag sulfates (Suprep) 17.5-3.13-1.6 gram solution; Take one bottle beginning at 6pm night before procedure and then take the other bottle 5 hours before procedure time as directed per instruction sheet    Colon cancer screening    Orders:  •  Colonoscopy Diagnostic; Future  •  sodium,potassium,mag sulfates (Suprep) 17.5-3.13-1.6 gram solution; Take one bottle beginning at 6pm night before procedure and then take the other bottle 5 hours before procedure time as directed per instruction sheet      I discussed the risks, benefits and alternative(s) of the procedure(s) with the patient. Pt verbalizes understanding and is willing to proceed. All questions were answered.    Thierry Laureano MD, MS    SUBJECTIVE   HPI: History obtained from  patient. Pt comes with her daughter    Pt is moving here from [place]. She has history of polyps. She was told she needed a repeat colonoscopy every 5 years. She is due for her next colonoscopy.    From a GI standpoint she denies any significant symptoms  Pt denies lower abdominal pain, constipation, diarrhea, blood in stool    Of note, she is s/p [place]    Patient's father had 32 polyps  Past Medical History:   Diagnosis Date   • Allergies    • Headache    • History of appendectomy    • History of cholecystectomy    • History of D&C     pcos   • IBS (irritable bowel syndrome)    • PCOS (polycystic ovarian syndrome)        Past Surgical History:   Procedure Laterality Date   • CATARACT EXTRACTION, BILATERAL     • EYE SURGERY      3x- all under age 7       Current Outpatient Medications   Medication Sig Dispense Refill   • acetaminophen (Tylenol Arthritis Pain) 650 mg ER tablet Take 2 tablets (1,300 mg) by mouth every 12 hours if needed for mild pain (1 - 3). Do not crush, chew, or split.     • buPROPion XL (Wellbutrin XL) 300 mg 24 hr tablet Take 1 tablet (300 mg) by mouth once daily. 90 tablet 3   • celecoxib (CeleBREX) 50 mg capsule Take 1 capsule (50 mg) by mouth 2 times a day. 90 capsule 3   • cyanocobalamin (Vitamin B-12) 1,000 mcg/mL injection Inject 1 mL (1,000 mcg) into the muscle every 30 (thirty) days. 1 mL 3   • DULoxetine (Cymbalta) 30 mg DR capsule TAKE 1 CAPSULE BY MOUTH IN THE MORNING AND 2 AT BEDTIME. DO NOT CHEW, CRUSH, OR OPEN CAPSULES     • fexofenadine (Allegra) 180 mg tablet Take 1 tablet (180 mg) by mouth once daily.     • ondansetron (Zofran) 8 mg tablet Take 1 tablet (8 mg) by mouth every 8 hours if needed for nausea or vomiting.     • pantoprazole (ProtoNix) 40 mg EC tablet Take 1 tablet (40 mg) by mouth once daily.     • pravastatin (Pravachol) 10 mg tablet Take 1 tablet (10 mg) by mouth once daily. 100 tablet 3   • promethazine (Phenergan) 12.5 mg tablet Take 1 tablet (12.5 mg) by mouth  "every 12 hours if needed.     • traZODone (Desyrel) 50 mg tablet Take 0.5 tablets (25 mg) by mouth once daily.     • valACYclovir (Valtrex) 1 gram tablet Take 1 tablet (1,000 mg) by mouth every 12 hours.       No current facility-administered medications for this visit.       Allergies   Allergen Reactions   • Pineapple Anaphylaxis   • Orange Hives   • [place] Hives     OBJECTIVE   PHYSICAL EXAM:  Pulse 89   Ht 1.651 m (5' 5\")   Wt 108 kg (238 lb)   LMP  (LMP Unknown)   BMI 39.61 kg/m²      LABS/IMAGING/SCOPES  Lab Results   Component Value Date    WBC 5.4 10/21/2024    HGB 12.2 10/21/2024    HCT 38.8 10/21/2024    MCV 93 10/21/2024     10/21/2024     Lab Results   Component Value Date    GLUCOSE 90 10/21/2024    CALCIUM 8.4 (L) 10/21/2024     10/21/2024    K 4.0 10/21/2024    CO2 29 10/21/2024     (H) 10/21/2024    BUN 8 10/21/2024    CREATININE 0.85 10/21/2024     Lab Results   Component Value Date    ALT 14 10/21/2024    AST 9 10/21/2024    ALKPHOS 104 10/21/2024    BILITOT 0.6 10/21/2024           Thierry Laureano MD, MS  "

## 2025-02-28 ENCOUNTER — OFFICE VISIT (OUTPATIENT)
Dept: SLEEP MEDICINE | Facility: CLINIC | Age: 55
End: 2025-02-28
Payer: MEDICARE

## 2025-02-28 VITALS
HEIGHT: 65 IN | SYSTOLIC BLOOD PRESSURE: 137 MMHG | OXYGEN SATURATION: 96 % | BODY MASS INDEX: 39.15 KG/M2 | RESPIRATION RATE: 18 BRPM | DIASTOLIC BLOOD PRESSURE: 87 MMHG | WEIGHT: 235 LBS | HEART RATE: 74 BPM

## 2025-02-28 DIAGNOSIS — F41.1 GAD (GENERALIZED ANXIETY DISORDER): ICD-10-CM

## 2025-02-28 DIAGNOSIS — G47.30 SLEEP DISORDER BREATHING: Primary | ICD-10-CM

## 2025-02-28 DIAGNOSIS — F32.A DEPRESSION, UNSPECIFIED DEPRESSION TYPE: ICD-10-CM

## 2025-02-28 DIAGNOSIS — Z72.0 TOBACCO ABUSE: ICD-10-CM

## 2025-02-28 DIAGNOSIS — G47.00 INSOMNIA, UNSPECIFIED TYPE: ICD-10-CM

## 2025-02-28 DIAGNOSIS — F51.04 CHRONIC INSOMNIA: ICD-10-CM

## 2025-02-28 PROCEDURE — 99204 OFFICE O/P NEW MOD 45 MIN: CPT

## 2025-02-28 PROCEDURE — 4004F PT TOBACCO SCREEN RCVD TLK: CPT

## 2025-02-28 PROCEDURE — 99214 OFFICE O/P EST MOD 30 MIN: CPT

## 2025-02-28 PROCEDURE — 3008F BODY MASS INDEX DOCD: CPT

## 2025-02-28 ASSESSMENT — SLEEP AND FATIGUE QUESTIONNAIRES
HOW LIKELY ARE YOU TO NOD OFF OR FALL ASLEEP WHILE LYING DOWN TO REST IN THE AFTERNOON WHEN CIRCUMSTANCES PERMIT: WOULD NEVER DOZE
SLEEP_PROBLEM_INTERFERES_DAILY_ACTIVITIES: SOMEWHAT
WORRIED_DISTRESSED_DUE_TO_SLEEP: NOT AT ALL NOTICEABLE
SLEEP_PROBLEM_NOTICEABLE_TO_OTHERS: MUCH
HOW LIKELY ARE YOU TO NOD OFF OR FALL ASLEEP WHILE SITTING AND READING: WOULD NEVER DOZE
SATISFACTION_WITH_CURRENT_SLEEP_PATTERN: DISSATISFIED
DIFFICULTY_FALLING_ASLEEP: VERY SEVERE
HOW LIKELY ARE YOU TO NOD OFF OR FALL ASLEEP WHILE SITTING AND TALKING TO SOMEONE: WOULD NEVER DOZE
DIFFICULTY_STAYING_ASLEEP: SEVERE
HOW LIKELY ARE YOU TO NOD OFF OR FALL ASLEEP WHILE WATCHING TV: WOULD NEVER DOZE
HOW LIKELY ARE YOU TO NOD OFF OR FALL ASLEEP IN A CAR, WHILE STOPPED FOR A FEW MINUTES IN TRAFFIC: WOULD NEVER DOZE
HOW LIKELY ARE YOU TO NOD OFF OR FALL ASLEEP WHILE SITTING QUIETLY AFTER LUNCH WITHOUT ALCOHOL: WOULD NEVER DOZE
SITING INACTIVE IN A PUBLIC PLACE LIKE A CLASS ROOM OR A MOVIE THEATER: WOULD NEVER DOZE
HOW LIKELY ARE YOU TO NOD OFF OR FALL ASLEEP WHEN YOU ARE A PASSENGER IN A CAR FOR AN HOUR WITHOUT A BREAK: SLIGHT CHANCE OF DOZING
ESS-CHAD TOTAL SCORE: 1
WAKING_TOO_EARLY: SEVERE

## 2025-02-28 ASSESSMENT — PATIENT HEALTH QUESTIONNAIRE - PHQ9
SUM OF ALL RESPONSES TO PHQ9 QUESTIONS 1 AND 2: 0
1. LITTLE INTEREST OR PLEASURE IN DOING THINGS: NOT AT ALL
2. FEELING DOWN, DEPRESSED OR HOPELESS: NOT AT ALL

## 2025-02-28 ASSESSMENT — PAIN SCALES - GENERAL: PAINLEVEL_OUTOF10: 5

## 2025-02-28 NOTE — PATIENT INSTRUCTIONS
"       [facility] Sleep Medicine  [facility] ONE  [facility] ONE  [address] [place] [address]       Thank you for coming to the Sleep Medicine Clinic today! Your sleep medicine provider today was: FARNAZ Escalante Below is a summary of your treatment plan, patient education, other important information, and our contact numbers.    Dear Ms Yevgeniy Hdez       Your Sleep Provider Today: FARNAZ Escalante  Your Primary Care Physician: Vineet Charles MD   Your Referring Provider: Vineet Charles MD      Thank you for visiting  Sleep Medicine Clinic !     1. We will proceed with a HOME sleep study to check your risk of sleep apnea. The lab will call you and schedule it for you.     2. Please do not drive when you are sleepy and start practicing the sleep hygiene as discussed in clinic.  -No smoking at least 2 hours before bed time  -Set up sleep time and wake time to regulate your sleep pattern     3. Please start using Biotene to ease your dry mouth if needed.    4.  FOR QUESTIONS AND CONCERNS:   a) :  To schedule, cancel, or reschedule SLEEP STUDY appointments, please call [phone]  b): Please call my office with issues or questions: [phone] ([place]); [phone] ([facility]); [phone] ([facility])      In the event that you are running more than 10 minutes late to your appointment, I will kindly ask you to reschedule. Thanks.      TREATMENT PLAN     - Do the following testing: home sleep apnea test  - Please read the \"Patient Education\" section below for more detailed information. Try implementing tips, reminders, strategies, and supportive management.   - If not yet done, please sign up for YR Free to make a future schedule, send prescription requests, or send messages.    Follow-up Appointment:   Follow-up in 3 months    PATIENT EDUCATION     OBSTRUCTIVE SLEEP APNEA (SALTY) is a sleep disorder where your upper airway muscles relax during sleep " and the airway intermittently and repetitively narrows and collapses leading to partially blocked airway (hypopnea) or completely blocked airway (apnea) which, in turn, can disrupt breathing in sleep, lower oxygen levels while you sleep and cause night time wakings. Because both apnea and hypopnea may cause higher carbon dioxide or low oxygen levels, untreated SALTY can lead to heart arrhythmia, elevation of blood pressure, and make it harder for the body to consolidate memory and facilitate metabolism (leading to higher blood sugars at night). Frequent partial arousals occur during sleep resulting in sleep deprivation and daytime sleepiness. SALTY is associated with an increased risk of cardiovascular disease, stroke, hypertension, and insulin resistance. Moreover, untreated SALTY with excessive daytime sleepiness can increase the risk of motor vehicular accidents.    Below are conservative strategies for SALTY regardless of SALTY severity are:   Positional therapy - Avoid sleeping on your back.   Healthy diet and regular exercise to optimize weight is highly encouraged.   Avoid alcohol late in the evening and sedative-hypnotics as these substances can make sleep apnea worse.   Improve breathing through the nose with intranasal steroid spray, saline rinse, or antihistamines    Safety: Avoid driving vehicle and operating heavy equipment while sleepy. Drowsy driving may lead to life-threatening motor vehicle accidents. A person driving while sleepy is 5 times more likely to have an accident. If you feel sleepy, pull over and take a short power nap (sleep for less than 30 minutes). Otherwise, ask somebody to drive you.    Treatment options for sleep apnea include weight management, positional therapy, Positive Airway Therapy (PAP) therapy, oral appliance therapy, hypoglossal nerve stimulator (Inspire) and select airway surgeries.    Sleep Testing for sleep apnea: The best and ideal way to check out if you have sleep apnea is to  do an overnight sleep study in the sleep laboratory. Alternatively, a home sleep apnea test can also be done depending on your insurance and risk factors.     If you are having a home sleep apnea test, kindly allot 1 hour during pickup of the testing kit as you will have to complete paperwork and listen to the sleep technician for in-person on-the-spot demonstration and instructions on how to hook up the testing kit at home. Do the test for 1 day and start off with sleeping on your back. If you sleep on your side in the middle of night or you have always been a side or stomach-sleeper, it is ok as long as you have some time on your back and off-back.     If you are having an overnight in-lab sleep study, please make sure to bring toiletries, a comfy pillow, additional warm blankets, and any nighttime medications (include as-needed inhaler, pain pill, etc) that you may regularly take. Also, be sure to eat dinner before you arrive as we generally do not provide meals inside the sleep testing center. Lastly, in order to fall asleep faster in the sleep testing center, we advise patients to wake up 2 hours earlier on the morning of scheduled testing and avoid napping 2 days prior testing. Sometimes, your sleep provider may prescribe a sleep aid to be taken at lights out in the sleep testing center. If you are taking a sleep aid, consider having somebody pick you up after the sleep testing.    Overnight sleep studies may be scheduled on a weekday or weekend. We also perform daytime testing for shift workers on a case-by-case basis.    Once you have booked an appointment to do the sleep study, please contact my office for follow-up visit to discuss results.    On the other hand, if you have any of the following, please consider calling the sleep testing center to RESCHEDULE your sleep study appointment:  If you tested positive for COVID within 10 days of your sleep study appointment.  If you were exposed to somebody who  was confirmed for COVID within 10 days of your sleep study appointment and now you are having symptoms of possible COVID  If you have fever>100F or any acute symptoms that you think will lead to poor sleep during testing (e.g. new or worsening stuffy nose not relieved by steroid nasal spray)  If you have traveled domestically or internationally in the last month and now you are having symptoms of possible COVID    Trouble falling asleep or trouble staying asleep is called INSOMNIA and it can be caused by many different things such as untreated sleep apnea, anxiety, depression, stress, poor sleep habits, and other medical conditions or medications. The best way to treat insomnia is to treat the cause. In general, we can all benefit from better sleep hygiene (also known as good sleep habits). Below are recommendations to help you improve your sleep so you can fall asleep, stay asleep, and wake up feeling refreshed.    Keep a routine bedtime and rise time even on weekends and non-work days. This helps your biological clock stay in sync with your sleep needs. If you currently have variable sleep-wake schedule, start off by waking up and getting out of bed the same time every day, even on weekends or non-work days. Whether you have a good or poor sleep the night before, waking up at the same time every day can help re-train and reset your body clock.  Go to bed when you are sleepy and not when tired nor before your goal bedtime. Long periods of time in bed will lead to fragmented, shallow, broken sleep.   Use the bed for sleep and intimacy only. Do not watch TV, eat, read or use phone/laptop in bed. Keeping sleep as the only activity in bed will help re-associate bed equals sleep.  Get up when you cannot sleep in 15-20 minutes. When you are unable to sleep, exit the bed, and go to another room or chair in bedroom, do something boring, calm, relaxing, distracting, or non-stimulating in dim lighting until you feel sleepy  "enough to fall back asleep. Avoid stimulating activity or any triggers for mental thinking (e.g. cellphone). Repeat as needed.  Avoid napping during the day to build up sleep pressure so that it won't be hard for you to fall asleep at night. Napping, particularly in the late afternoon or early evening may interfere with your night's sleep. If naps are necessary, limit naps under 15-20 minutes and not later than 3 PM.   Create a \"buffer zone\" or \"wind-down time\". This is a quiet time prior to bedtime, typically at least 30 minutes and perhaps as long as 1-2 hours. During this time, you should do things that are enjoyable, relaxing, and not necessarily goal-oriented like performing relaxation exercises, listening to relaxing music, reading a boring book, dimming the lights, or eating a light bedtime snack like a glass of milk and banana which can promote sleep. Activities that promote relaxation before sleep is important because these can help quiet the mind and relax the body to get into the right state for sleep.   Do not worry or plan in bed. If you are worrying, planning, feeling anxious, or cannot shut off your thoughts, get up and stay out of bed until you have quieted your mind. Set up a “scheduled worry time” in the morning or late afternoon to write down your worries and stressors as well as plans for the following day.   Keep your bedroom quiet, dark, and cool. Ideal sleeping temperature is 65F. If light bothers you, get a slumber mask or blackout shades. If noise bothers you, put ear plugs or get a white noise machine. Alternatively, you may turn on fan or humidifier to create a constant background noise to eliminate unexpected sounds that would otherwise wake you up in the middle of your sleep.  Keep your bedroom technology free. Avoid exposure to TV and electronics 1-2 hours prior to bedtime. May also consider wearing \"blue light blocker\" eyeglasses.  Turn the clock around to avoid clock-watching. " Thoughts of the time can cause frustration and make it more difficult to go to sleep.   Other things to avoid to help   Avoid  caffeine (including chocolate) intake in the late afternoon and early evening. Limit the amount of caffeine and consume before noon.   Avoid smoking 2-3 hours before bedtime. Like caffeine, nicotine in cigarette smoking acts a stimulant.   Avoid alcohol intake 2-3 hours before bedtime. Although alcohol may seem to help you fall asleep more easily as it slows down brain activity, it also disrupts your sleep during the night by causing frequent awakenings as the effect of alcohol wears off. Additionally, alcohol worsens snoring and sleep apnea  Avoid eating a heavy meal (high-fat, high-carbohydrate, gas-producing foods) at dinner and 2-3 hours before bedtime.    Avoid drinking more than 8 oz liquids in the evening. Limit fluid intake at 8 oz during dinner. Restrict fluids after dinner. May take sips of water enough to swallow bedtime medications. Void at bedtime.  Avoid physical exercise or hot shower/bath within 2 hours of bedtime. Regular exercise can improve sleep quality, but exercising or having a hot shower/bath too close to bedtime can disrupt sleep onset. The best time to exercise to help sleep is in the late afternoon or early evening but not within 2 hours of bedtime. In order to promote sleep, hot shower/bath can be taken in the evening for 15-20 minutes but not within 2 hours of going to bed.   Avoid sleeping with pets or kids if they appear to bother your sleep and/or sleep quality.  Last but the least, DO NOT TRY TOO HARD TO SLEEP. JUST ALLOW SLEEP TO UNFOLD.    MOST IMPORTANTLY:  BE PATIENT!  BE CONSISTENT!  Your sleep problem developed over time so it will take some time and consistency to return to a more normal sleep pattern. By following the suggestions listed above, you should see gradual sleep improvements over time.    Also you have been recommended to do Cognitive  Behavioral Therapy for Insomnia (CBT-I). CBT-I is widely recognized as an effective treatment for a wide range of insomnias. CBT-I is typically made up of a number of components including assessment, behavioral and cognitive interventions, motivational techniques, and relapse prevention skills. An overwhelming amount of evidence suggests that CBT-I is as effective as hypnotics and the newer non-benzodiazepine sleep aides in the acute treatment and is more effective than medication in the long term treatment of insomnia.     Below are some resources for CBT-I:  To see a Behavioral Sleep Medicine specialist for one-on-one counseling  CBT-I at the [facility] - Dr. Nirav Proctor PsyD or Dr. Nena Miranda, PhD - Phone: [phone]  Fax: [phone]. There may be a several month waiting period.  CBT-I at [facility] - Cherelle Velasquez PsyD (Call [phone] and speak with Yudith Hill  Fax: [phone] or backup fax: [phone])  Dr. Citlaly Segundo - [email or web address]  - She only does telemedicine. She was formerly part of  but is in private practice and would be out-of-network  Dr. Thorne - one on one CBT-I service www.[employer]. You may have to pay out of pocket and submit visits to your insurance for reimbursement.  Other BSM providers in [place]:  https://www.behavioralsleep.org/index.php/directory/browse-by/state?value=[place]    2. If doing CBTi using an online platform, there are several online platforms that are good resources, but may vary based on cost. These include:  GoToSnahomy- online course via [facility]. Self-guided. One time charge to sign up: Sin ($40)  The SleepReset - online guided cognitive behavioral therapy https://[email or web address]/pricing ($300 for 8 weeks)  Sleepio - https://www.sleepio.com/sleepio/welcomeusint/354#1/1 (Some insurances or employers may cover - check with your HR Benefits office)  SleepEZ- Free self-guided course from the [facility]  https://www.veterantraining.va.gov/insomnia/    You can also go to the following EDUCATION WEBSITES for further information:   American Academy of Sleep Medicine http://sleepeducation.org  National Sleep Foundation: https://sleepfoundation.org  American Sleep Apnea Association: https://www.sleepapnea.org (for patients with sleep apnea)  Narcolepsy Network: https://www.narcolepsynetwork.org (for patients with narcolepsy)  WakeUp[employer]ppycolepsy inc: https://www.Pure Klimaschutzcolepsy.org (for patients with narcolepsy)  Hypersomnia Foundation: https://www.hypersomniafoundation.org (for patients with idiopathic hypersomnia)  RLS foundation: https://www.rls.org (for patients with restless leg syndrome)    IMPORTANT INFORMATION     Call 911 for medical emergencies.  Our offices are generally open from Monday-Friday, 8 am - 5 pm.   There are no supporting services by either the sleep doctors or their staff on weekends and Holidays, or after 5 PM on weekdays.   If you need to get in touch with me, you may either call my office number or you can use [employer].  If a referral for a test, for CPAP, or for another specialist was made, and you have not heard about scheduling this within a week, please call scheduling at [phone] ([phone]).  If you are unable to make your appointment for clinic or an overnight study, kindly call the office or sleep testing center at least 48 hours in advance to cancel and reschedule.  If you are on CPAP, please bring your device's card and/or the device to each clinic appointment.   In case of problems with PAP machine or mask interface, please contact your [employer] (Durable Medical Equipment) company first. [employer] is the company who provides you the machine and/or PAP supplies.       PRESCRIPTIONS     We require 7 days advanced notice for prescription refills. If we do not receive the request in this time, we cannot guarantee that your medication will be refilled in time.    IMPORTANT PHONE NUMBERS     Sleep  Medicine Clinic Fax: [phone]  Appointments (for Pediatric Sleep Clinic): [phone] - option 1  Appointments (for Adult Sleep Clinic): [phone] - option 2  Appointments (For Sleep Studies): [phone] - option 3  Behavioral Sleep Medicine: [phone]  Sleep Surgery: [phone]  Nutrition Service: [phone]  Weight management clinics with endocrinology: [phone]  Bariatric Services: [phone] (includes weight loss medications and weight loss surgery)  [facility]: [phone] (offers holistic approaches to weight management)  ENT (Otolaryngology): [phone]  Headache Clinic (Neurology): [phone]  Neurology: [phone]  Psychiatry: [phone]  Pulmonary Function Testing (PFT) Center: [phone]  Pulmonary Medicine: [phone]  [employer]): [phone]      OUR SLEEP TESTING LOCATIONS     Our team will contact you to schedule your sleep study, however, you can contact us as follow:  Main Phone Line (scheduling only): [phone] ([phone]), option 3  Adult and Pediatric Locations  [facility] (6 years and older): [facility] - 4th floor ([address] [place] [address]) After hours line: [phone]  [facility] (Main campus: All ages): [facility], 6th floor. After hours line: [phone]   [place] (5 years and older; younger considered on case-by-case basis): [address]; [address], [address]. Scheduling  After hours line: [phone]   [place] (6 years and older): [address]; [address]; [address]   [place] (6 years and older): [address] [facility], [address]  After hours line: [phone]   [place] (13 years and older) in [place]: [address], 2nd floor  After hours line: [phone]  [facility] (13 year and older): [address]  After hours line: [phone]     Adult Only Locations:    "[place] (18 years and older): [address] [facility], 2nd floor   [place] (18 years and older): [address] [facility]; 4th floor  After hours line: [phone]   [place] (18 years and older) at [place]: [address] [facility]  After hours line: [phone]     CONTACTING YOUR SLEEP MEDICINE PROVIDER AND SLEEP TEAM      For issues with your machine or mask interface, please call your DME provider first. [employer] stands for durable medical company. DME is the company who provides you the machine and/or PAP supplies / accessories.   To schedule, cancel, or reschedule SLEEP STUDY APPOINTMENTS, please call the Main Phone Line at [phone] - option 3.   To schedule, cancel, or reschedule CLINIC APPOINTMENTS, you can do it in \"MyChart\", call [phone] to speak with my  (Janeth Perez), or call the Main Phone Line at [phone] - option 2  For CLINICAL QUESTIONS or MEDICATION REFILLS, please call direct line for Adult Sleep Nurses at [phone].   Lastly, you can also send a message directly to your provider through \"My Chart\", which is the email service through your  Records Account: [email or web address]       Here at [facility], we wish you a restful sleep!   "

## 2025-03-24 ENCOUNTER — CLINICAL SUPPORT (OUTPATIENT)
Dept: SLEEP MEDICINE | Facility: CLINIC | Age: 55
End: 2025-03-24
Payer: MEDICARE

## 2025-03-24 DIAGNOSIS — G47.33 OBSTRUCTIVE SLEEP APNEA (ADULT) (PEDIATRIC): ICD-10-CM

## 2025-03-24 DIAGNOSIS — G47.30 SLEEP DISORDER BREATHING: ICD-10-CM

## 2025-03-24 PROCEDURE — 95806 SLEEP STUDY UNATT&RESP EFFT: CPT | Performed by: PSYCHIATRY & NEUROLOGY

## 2025-03-24 NOTE — PROGRESS NOTES
Type of Study: HOME SLEEP STUDY - NOMAD     The patient received equipment and instructions for use of the [employer] Nomad HSAT device. The patient was instructed how to apply the effort belts, cannula, thermistor. It was also explained how the Nomad and oximeter components work.  The patient was asked to record their sleep for at least a 6 hour period.     The patient was informed of their responsibility for the device and acknowledged this by signing the HSAT device contract. The patient was asked to return the device on 3/25/2025 between the hours of 8am to 3pm to the Sleep Center in [facility] at [facility].    Of note, patient is mostly blind.  She could not see equipment to fully explain application.  Tech wrote detail notes with the instructions to help her and her family members apply the equipment at home.     The patient was instructed to call 911 as usual for any medical- emergencies while at home.  The patient was also given a phone number for troubleshooting when using the device in case there were additional questions.

## 2025-03-29 LAB
ALBUMIN SERPL-MCNC: 4.1 G/DL (ref 3.6–5.1)
ALBUMIN/GLOB SERPL: 1.7 (CALC) (ref 1–2.5)
ALP SERPL-CCNC: 162 U/L (ref 37–153)
ALT SERPL-CCNC: 12 U/L (ref 6–29)
AST SERPL-CCNC: 8 U/L (ref 10–35)
BILIRUB DIRECT SERPL-MCNC: 0.1 MG/DL
BILIRUB INDIRECT SERPL-MCNC: 0.4 MG/DL (CALC) (ref 0.2–1.2)
BILIRUB SERPL-MCNC: 0.5 MG/DL (ref 0.2–1.2)
GLOBULIN SER CALC-MCNC: 2.4 G/DL (CALC) (ref 1.9–3.7)
PROT SERPL-MCNC: 6.5 G/DL (ref 6.1–8.1)

## 2025-04-04 ENCOUNTER — APPOINTMENT (OUTPATIENT)
Dept: GASTROENTEROLOGY | Facility: EXTERNAL LOCATION | Age: 55
End: 2025-04-04
Payer: MEDICARE

## 2025-04-07 ENCOUNTER — TELEPHONE (OUTPATIENT)
Dept: PRIMARY CARE | Facility: CLINIC | Age: 55
End: 2025-04-07
Payer: MEDICARE

## 2025-04-07 NOTE — TELEPHONE ENCOUNTER
----- Message from Vineet Charles sent at 4/2/2025  3:16 PM EDT -----  ALP was elevated, I would repeat in one month.

## 2025-04-07 NOTE — TELEPHONE ENCOUNTER
Called and spoke to patient regarding results of labs.     Patient verbalized understanding and understands to get lab retest at the beginning of may.     No further questions or concerns

## 2025-04-16 DIAGNOSIS — G47.33 OSA (OBSTRUCTIVE SLEEP APNEA): Primary | ICD-10-CM

## 2025-04-25 ENCOUNTER — TELEPHONE (OUTPATIENT)
Dept: SLEEP MEDICINE | Facility: HOSPITAL | Age: 55
End: 2025-04-25
Payer: MEDICARE

## 2025-04-25 NOTE — TELEPHONE ENCOUNTER
Called patient and left VM regarding the availability of sleep test result.  Sleep nurse phone number [phone] - given to call back for results and plan going forward.

## 2025-04-25 NOTE — TELEPHONE ENCOUNTER
----- Message from Britta Koenig sent at 4/16/2025  4:15 PM EDT -----  This is [facility]'s patient. Please call patient and let them know that the recent sleep study showed positive for MILD positional obstructive sleep apnea. First line of treatment for sleep apnea is CPAP   machine. Order placed for CPAP machine and sent to [facility]. [facility] will do prior auth thru health insurance and once approved, [facility] will call them to discuss setup and co-pay. For set-up, [facility] can go to   patient's house to deliver machine, do mask fitting, and teach patient how to use and maintain machine. Alternatively, patient can go to [facility] office to  machine, get mask fitting, and patient   education about CPAP usage. On the other hand, for co-pay, patient and insurance are both paying monthly fee to [facility]. After 13 months of paying, they stop paying coz they own the machine. It is like   rent-to-own basis. Once patient gets the machine, patient has to remember 4 things as follows:  Patient has to use machine all night every night or at least 4 hours daily. If patient naps more than 1 hour, patient still needs to use CPAP during naps. This 4 hours daily usage requirement is   coming for insurance. Most insurance gives some sort of incentive if patient is using machine more than 4 hours every night.   Patient needs to schedule appt with [facility]lee 31 days after getting new machine for troubleshooting as well as to make sure that CPAP is actually improving sleep apnea. If patient had issues with machine   or mask, he or she needs to call the DME. If patient still cannot tolerate CPAP, patient can ask about PAP alternatives with [facility] or get a different mask during follow-up. Again, this follow-up appt   is another insurance requirement.  There is 30 day mask guarantee. Once patient gets machine and did not like mask, patient needs to call DME to ask for a different mask for free as long as it is within 30 days of getting machine.   After 30 days, no more free  mask, patient will either have to pay out-of-pocket or wait for the next replacement schedule. Insurance dictates the frequency of replacing PAP accessories like hose,   headgear, mask cushion, and water tank  4.   Cleaning of PAP accessories like hose and water tank is very necessary and should be done at least once a week with soap and water. On the other hand, mask interface is cleaned every day either   with CPAP wipes or soap and water. Insurance allows patient to get replacement of hose and water tank every 6 months for non-Medicaid patients and 12 months for Medicaid patients. There is a   disposable filter at the back of machine that needs to be replaced every 2 weeks or else, patient can get frequent sinus congestion. If patient wants to replace accessories more often than what   insurance requires, he or she can go to [employer] or [employer] or [employer] and buy them.  Lastly, aside from CPAP, supportive management is also necessary as follows: Lose weight. Stay off your back when sleeping. Avoid smoking, alcohol, and sedating medications if applicable. Don't drive   when sleepy.   ----- Message -----  From: Brittani Turner  Sent: 3/31/2025   7:55 AM EDT  To: FARNAZ Escalante; Fabian Yusuf MD    [facility] for patient to call back and schedule  ----- Message -----  From: Fabian Yusuf MD  Sent: 3/29/2025   4:10 AM EDT  To: Brittani Turner    Please schedule follow-up appointment to discuss results with Glendy Peralta  ----- Message -----  From: Bran Etienne - Lab Results In  Sent: 3/27/2025   4:57 PM EDT  To: FARNAZ Escalante

## 2025-05-01 ENCOUNTER — PATIENT MESSAGE (OUTPATIENT)
Dept: PRIMARY CARE | Facility: CLINIC | Age: 55
End: 2025-05-01
Payer: MEDICARE

## 2025-05-01 DIAGNOSIS — Z78.0 POST-MENOPAUSAL: Primary | ICD-10-CM

## 2025-05-06 DIAGNOSIS — Z76.0 MEDICATION REFILL: ICD-10-CM

## 2025-05-06 RX ORDER — CYANOCOBALAMIN 1000 UG/ML
INJECTION, SOLUTION INTRAMUSCULAR; SUBCUTANEOUS
Qty: 1 ML | Refills: 0 | Status: SHIPPED | OUTPATIENT
Start: 2025-05-06

## 2025-05-08 ENCOUNTER — PATIENT MESSAGE (OUTPATIENT)
Dept: PRIMARY CARE | Facility: CLINIC | Age: 55
End: 2025-05-08
Payer: MEDICARE

## 2025-05-08 DIAGNOSIS — F41.1 GAD (GENERALIZED ANXIETY DISORDER): Primary | ICD-10-CM

## 2025-05-08 RX ORDER — DULOXETIN HYDROCHLORIDE 30 MG/1
CAPSULE, DELAYED RELEASE ORAL
Qty: 180 CAPSULE | Refills: 3 | Status: SHIPPED | OUTPATIENT
Start: 2025-05-08

## 2025-05-19 ENCOUNTER — HOSPITAL ENCOUNTER (OUTPATIENT)
Dept: RADIOLOGY | Facility: CLINIC | Age: 55
Discharge: HOME | End: 2025-05-19
Payer: MEDICARE

## 2025-05-19 DIAGNOSIS — R51.9 HEADACHE, UNSPECIFIED: ICD-10-CM

## 2025-05-19 DIAGNOSIS — Z78.0 POST-MENOPAUSAL: ICD-10-CM

## 2025-05-19 PROCEDURE — 77080 DXA BONE DENSITY AXIAL: CPT | Performed by: RADIOLOGY

## 2025-05-19 PROCEDURE — 77080 DXA BONE DENSITY AXIAL: CPT

## 2025-05-19 PROCEDURE — 70330 X-RAY EXAM OF JAW JOINTS: CPT | Mod: BILATERAL PROCEDURE | Performed by: RADIOLOGY

## 2025-05-19 PROCEDURE — 70330 X-RAY EXAM OF JAW JOINTS: CPT

## 2025-05-20 NOTE — RESULT ENCOUNTER NOTE
Evidence of osteoporosis on bone mineral density scan. Should follow up with rheumatology for treatment option discussion.

## 2025-05-29 ENCOUNTER — APPOINTMENT (OUTPATIENT)
Dept: SLEEP MEDICINE | Facility: CLINIC | Age: 55
End: 2025-05-29
Payer: MEDICARE

## 2025-05-30 ENCOUNTER — APPOINTMENT (OUTPATIENT)
Dept: GASTROENTEROLOGY | Facility: EXTERNAL LOCATION | Age: 55
End: 2025-05-30
Payer: MEDICARE

## 2025-05-30 DIAGNOSIS — Z12.11 COLON CANCER SCREENING: Primary | ICD-10-CM

## 2025-05-30 DIAGNOSIS — Z83.719 FAMILY HISTORY OF COLON POLYPS, UNSPECIFIED: ICD-10-CM

## 2025-05-30 DIAGNOSIS — Z86.0101 PERSONAL HISTORY OF ADENOMATOUS AND SERRATED COLON POLYPS: ICD-10-CM

## 2025-05-30 DIAGNOSIS — Z12.11 COLON CANCER SCREENING: ICD-10-CM

## 2025-05-30 PROCEDURE — 45378 DIAGNOSTIC COLONOSCOPY: CPT | Performed by: INTERNAL MEDICINE

## 2025-06-05 DIAGNOSIS — Z76.0 MEDICATION REFILL: ICD-10-CM

## 2025-06-05 RX ORDER — CYANOCOBALAMIN 1000 UG/ML
INJECTION, SOLUTION INTRAMUSCULAR; SUBCUTANEOUS
Qty: 1 ML | Refills: 0 | Status: SHIPPED | OUTPATIENT
Start: 2025-06-05

## 2025-07-04 DIAGNOSIS — Z76.0 MEDICATION REFILL: ICD-10-CM

## 2025-07-06 DIAGNOSIS — Z76.0 MEDICATION REFILL: ICD-10-CM

## 2025-07-08 RX ORDER — CYANOCOBALAMIN 1000 UG/ML
INJECTION, SOLUTION INTRAMUSCULAR; SUBCUTANEOUS
Qty: 1 ML | Refills: 0 | Status: SHIPPED | OUTPATIENT
Start: 2025-07-08

## 2025-07-08 RX ORDER — CELECOXIB 50 MG/1
50 CAPSULE ORAL 2 TIMES DAILY
Qty: 90 CAPSULE | Refills: 0 | Status: SHIPPED | OUTPATIENT
Start: 2025-07-08

## 2025-08-18 DIAGNOSIS — Z76.0 MEDICATION REFILL: ICD-10-CM

## 2025-08-19 RX ORDER — CELECOXIB 50 MG/1
50 CAPSULE ORAL 2 TIMES DAILY
Qty: 90 CAPSULE | Refills: 0 | Status: SHIPPED | OUTPATIENT
Start: 2025-08-19

## 2025-10-28 ENCOUNTER — APPOINTMENT (OUTPATIENT)
Dept: SLEEP MEDICINE | Facility: CLINIC | Age: 55
End: 2025-10-28
Payer: MEDICARE

## 2025-12-16 ENCOUNTER — APPOINTMENT (OUTPATIENT)
Dept: PRIMARY CARE | Facility: CLINIC | Age: 55
End: 2025-12-16
Payer: MEDICARE